# Patient Record
Sex: FEMALE | URBAN - METROPOLITAN AREA
[De-identification: names, ages, dates, MRNs, and addresses within clinical notes are randomized per-mention and may not be internally consistent; named-entity substitution may affect disease eponyms.]

---

## 2023-03-10 NOTE — IMPRESSION/PLAN
Impression: Keratoconjunctivitis sicca, bilateral: B99.952. Plan: Recommend artificial tears at least 4 times a day and gel drop or tear ointment at bedtime.

## 2023-03-10 NOTE — IMPRESSION/PLAN
Impression: Hashimoto's thyroiditis: E06.3. Plan: Mild stare appearance OU. Diagnosis with Hashimotos, although takes methimazole, typically for hyperthyroid, Refer to Oculoplastics next available for tepezza consult.

## 2023-05-30 ENCOUNTER — OFFICE VISIT (OUTPATIENT)
Dept: URBAN - METROPOLITAN AREA CLINIC 43 | Facility: CLINIC | Age: 33
End: 2023-05-30
Payer: COMMERCIAL

## 2023-05-30 DIAGNOSIS — E05.80 OTHER THYROTOXICOSIS WITHOUT THYROTOXIC CRISIS OR STORM: Primary | ICD-10-CM

## 2023-05-30 PROCEDURE — 92285 EXTERNAL OCULAR PHOTOGRAPHY: CPT | Performed by: STUDENT IN AN ORGANIZED HEALTH CARE EDUCATION/TRAINING PROGRAM

## 2023-05-30 PROCEDURE — 92083 EXTENDED VISUAL FIELD XM: CPT | Performed by: STUDENT IN AN ORGANIZED HEALTH CARE EDUCATION/TRAINING PROGRAM

## 2023-05-30 PROCEDURE — 92133 CPTRZD OPH DX IMG PST SGM ON: CPT | Performed by: STUDENT IN AN ORGANIZED HEALTH CARE EDUCATION/TRAINING PROGRAM

## 2023-05-30 PROCEDURE — 92002 INTRM OPH EXAM NEW PATIENT: CPT | Performed by: STUDENT IN AN ORGANIZED HEALTH CARE EDUCATION/TRAINING PROGRAM

## 2023-05-30 NOTE — IMPRESSION/PLAN
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 Plan: H/o Hashimoto's thyroiditis on levothyroxine, diagnosed approx 8-12 months ago. Saw endocrinologist who said patient had Grave's disease, not Hashimoto's. Subsequently started on methimazole. Endocrinologist Dr. Amie Bowser JULI score: 
Spontaneous retrobulbar pain - yes OU Pain with eye movement - no OU Redness of eyelids - no OU Redness of conjunctiva - no OU Swelling of eyelids - yes OU Swelling of caruncle/plica - yes OU Swelling of conjunctiva (chemosis) - no OU
3/7 OD, 3/7 OS No diplopia. Full motility. Color vision 12/14. No RAPD, visual acuity good, optic nerves appear normal.
No lagophthalmos. Mild upper lid retraction and lid edema/fullness. Never smoker - discussed importance of avoiding smoking. HVF 24-2 (5/30/23, first test) OD: generalized reduction of sensitivity, possible nasal defect OS: poor reliability, possible nasal defect OCT Optic nerve (5/30/23, first test) OD: no thinning OS: no thinning Recommend CT orbits w/wo contrast and thyroid panel. Return 1-2 months, sooner prn.

## 2024-05-30 ENCOUNTER — OFFICE VISIT (OUTPATIENT)
Dept: URBAN - METROPOLITAN AREA CLINIC 44 | Facility: CLINIC | Age: 34
End: 2024-05-30
Payer: COMMERCIAL

## 2024-05-30 DIAGNOSIS — E05.80 OTHER THYROTOXICOSIS WITHOUT THYROTOXIC CRISIS OR STORM: Primary | ICD-10-CM

## 2024-05-30 PROCEDURE — 92012 INTRM OPH EXAM EST PATIENT: CPT | Performed by: STUDENT IN AN ORGANIZED HEALTH CARE EDUCATION/TRAINING PROGRAM

## 2024-05-30 ASSESSMENT — INTRAOCULAR PRESSURE
OS: 17
OD: 17

## 2024-07-24 ENCOUNTER — OFFICE VISIT (OUTPATIENT)
Dept: URBAN - METROPOLITAN AREA CLINIC 43 | Facility: CLINIC | Age: 34
End: 2024-07-24
Payer: COMMERCIAL

## 2024-07-24 DIAGNOSIS — E05.80 OTHER THYROTOXICOSIS WITHOUT THYROTOXIC CRISIS OR STORM: Primary | ICD-10-CM

## 2024-07-24 PROCEDURE — 92012 INTRM OPH EXAM EST PATIENT: CPT | Performed by: STUDENT IN AN ORGANIZED HEALTH CARE EDUCATION/TRAINING PROGRAM

## 2024-07-24 PROCEDURE — 92285 EXTERNAL OCULAR PHOTOGRAPHY: CPT | Performed by: STUDENT IN AN ORGANIZED HEALTH CARE EDUCATION/TRAINING PROGRAM
